# Patient Record
(demographics unavailable — no encounter records)

---

## 2024-12-11 NOTE — ASSESSMENT
[FreeTextEntry1] : IMP:  Status post left breast lumpectomy on 12/9/21.   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 Patient deferred chemoprevention No evidence of new or recurrent lesions.  Breast imaging failed to identify any suspicious lesions.  No suspicious lesions on exam.   USGB 11/2024: benign breast tissue  PLAN:  L US in 5/2025- ordered MMG/US Nov 2025 Return in one year  All medical entries were at my, Dr. Enrique Holden, direction.  I have reviewed the chart and agree that the record accurately reflects my personal performance of the history, physical exam, assessment, and plan.  Our office nurse practitioner was present for the duration of the office visit.

## 2024-12-11 NOTE — PHYSICAL EXAM
[Normal] : supple, no neck mass and thyroid not enlarged [Normal Supraclavicular Lymph Nodes] : normal supraclavicular lymph nodes [Normal Axillary Lymph Nodes] : normal axillary lymph nodes [Normal] : oriented to person, place and time, with appropriate affect [FreeTextEntry1] : SC present during exam  [de-identified] : Normal S1, S2.  Regular rate and rhythm.  [de-identified] : Complete breast exam performed in supine and upright positions.  No palpable masses, tenderness, nipple discharge, inversion, deviation or enlarged axillary or supraclavicular lymph nodes bilaterally.  [de-identified] : Clear breath sounds bilaterally, normal respiratory effort.

## 2024-12-11 NOTE — HISTORY OF PRESENT ILLNESS
[de-identified] : Ms. ALMA MENA is a 53 year old pre-menopausal female who presents today for a follow up visit.   She was initially seen in consultation on 10/12/21 for evaluation of a left breast mass. Referred by Dr. Padilla   She has a prior history of a benign stereotactic biopsy of the left breast in 2020 (path demonstrated PASH) and a benign right ultrasound-guided biopsy.  She has a family history of breast cancer involving her maternal grandmother.  The patient states that she has undergone genetic testing and is BRCA negative.  She completed a bilateral mammogram and sonogram in December 2020 with benign findings (BIRADS 2).   She met with a breast surgeon (Dr. Palleschi) who referred her for a breast MRI given finding of PASH on prior biopsy.  Breast MRI was performed on 9/17/21 at [facility] which revealed a 5 mm enhancing nodule in the left 6:00 breast. Correlation with prior outside mammo and sono is recommended.  If these records are not available, a targeted ultrasound is recommended (BIRADS 0).   Left breast sono 9/29/21: Hypoechoic tissue in the left 5-6:00 breast likely secondary to post biopsy scarring. No sonographic correlate to the MRI finding in the left breast. MRI guided biopsy of the left breast is recommended (BIRADS 4).  She is status post left breast lumpectomy on 12/9/21.  Final pathology demonstrated focal atypical lobular hyperplasia.  She met with Dr. Diana Way from medical oncology who recommended chemoprevention with Tamoxifen.  Patient was going to think it over and get back to Dr. Way.  She ultimately declined Tamoxifen.  She has been experiencing left breast pain/tenderness which she attributes to scar tissue from previous biopsy.  This resolves after her menstrual cycle.  This has not changed.  She denies any palpable breast mass, nipple discharge, nipple inversion or skin change.  She admits that she does not perform self breast exams because it causes her anxiety.   MMG/US on 11/4/2024 shows left breast mass at 3:00 w/ angular margins. B0  L US on 11/8/2024 reveals Ill-defined mass with associated calcifications at the 3:00 position of the left breast. USGB recommended. B4B  L 3:00 USGB on 11/21/2024: benign breast tissue, benign and concordant. US in 6 months

## 2024-12-11 NOTE — OB HISTORY
[Menarche Age ____] : menarche age [unfilled] [Total Preg ___] : G[unfilled] [Live Births ___] : P[unfilled]  [FreeTextEntry2] : age 23 at first pregnancy

## 2024-12-11 NOTE — HISTORY OF PRESENT ILLNESS
[de-identified] : Ms. ALMA MENA is a 53 year old pre-menopausal female who presents today for a follow up visit.   She was initially seen in consultation on 10/12/21 for evaluation of a left breast mass. Referred by Dr. Padilla   She has a prior history of a benign stereotactic biopsy of the left breast in 2020 (path demonstrated PASH) and a benign right ultrasound-guided biopsy.  She has a family history of breast cancer involving her maternal grandmother.  The patient states that she has undergone genetic testing and is BRCA negative.  She completed a bilateral mammogram and sonogram in December 2020 with benign findings (BIRADS 2).   She met with a breast surgeon (Dr. Palleschi) who referred her for a breast MRI given finding of PASH on prior biopsy.  Breast MRI was performed on 9/17/21 at [facility] which revealed a 5 mm enhancing nodule in the left 6:00 breast. Correlation with prior outside mammo and sono is recommended.  If these records are not available, a targeted ultrasound is recommended (BIRADS 0).   Left breast sono 9/29/21: Hypoechoic tissue in the left 5-6:00 breast likely secondary to post biopsy scarring. No sonographic correlate to the MRI finding in the left breast. MRI guided biopsy of the left breast is recommended (BIRADS 4).  She is status post left breast lumpectomy on 12/9/21.  Final pathology demonstrated focal atypical lobular hyperplasia.  She met with Dr. Diana Way from medical oncology who recommended chemoprevention with Tamoxifen.  Patient was going to think it over and get back to Dr. Way.  She ultimately declined Tamoxifen.  She has been experiencing left breast pain/tenderness which she attributes to scar tissue from previous biopsy.  This resolves after her menstrual cycle.  This has not changed.  She denies any palpable breast mass, nipple discharge, nipple inversion or skin change.  She admits that she does not perform self breast exams because it causes her anxiety.   MMG/US on 11/4/2024 shows left breast mass at 3:00 w/ angular margins. B0  L US on 11/8/2024 reveals Ill-defined mass with associated calcifications at the 3:00 position of the left breast. USGB recommended. B4B  L 3:00 USGB on 11/21/2024: benign breast tissue, benign and concordant. US in 6 months

## 2024-12-11 NOTE — PHYSICAL EXAM
[Normal] : supple, no neck mass and thyroid not enlarged [Normal Supraclavicular Lymph Nodes] : normal supraclavicular lymph nodes [Normal Axillary Lymph Nodes] : normal axillary lymph nodes [Normal] : oriented to person, place and time, with appropriate affect [FreeTextEntry1] : SC present during exam  [de-identified] : Normal S1, S2.  Regular rate and rhythm.  [de-identified] : Complete breast exam performed in supine and upright positions.  No palpable masses, tenderness, nipple discharge, inversion, deviation or enlarged axillary or supraclavicular lymph nodes bilaterally.  [de-identified] : Clear breath sounds bilaterally, normal respiratory effort.